# Patient Record
Sex: MALE | Race: WHITE | Employment: STUDENT | URBAN - METROPOLITAN AREA
[De-identification: names, ages, dates, MRNs, and addresses within clinical notes are randomized per-mention and may not be internally consistent; named-entity substitution may affect disease eponyms.]

---

## 2022-06-14 ENCOUNTER — HOSPITAL ENCOUNTER (EMERGENCY)
Age: 22
Discharge: HOME | End: 2022-06-14
Payer: COMMERCIAL

## 2022-06-14 VITALS
TEMPERATURE: 98.3 F | BODY MASS INDEX: 29.8 KG/M2 | HEART RATE: 85 BPM | RESPIRATION RATE: 20 BRPM | DIASTOLIC BLOOD PRESSURE: 75 MMHG | HEIGHT: 72 IN | SYSTOLIC BLOOD PRESSURE: 133 MMHG | OXYGEN SATURATION: 97 % | WEIGHT: 220 LBS

## 2022-06-14 DIAGNOSIS — S61.214A LACERATION OF RIGHT RING FINGER WITHOUT FOREIGN BODY WITHOUT DAMAGE TO NAIL, INITIAL ENCOUNTER: Primary | ICD-10-CM

## 2022-06-14 PROCEDURE — 99282 EMERGENCY DEPT VISIT SF MDM: CPT

## 2022-06-14 ASSESSMENT — PAIN - FUNCTIONAL ASSESSMENT: PAIN_FUNCTIONAL_ASSESSMENT: 0-10

## 2022-06-14 ASSESSMENT — PAIN SCALES - GENERAL: PAINLEVEL_OUTOF10: 4

## 2022-06-15 NOTE — ED NOTES
Pt suture repair applied with PSO/Gauze/kerlex/coban. Pt instructed on at home care of wound.      Colonel Jai LPN  [id number]/95/41 [id number]

## 2022-06-15 NOTE — ED PROVIDER NOTES
Smoking status: Never Smoker    Smokeless tobacco: Not on file   Substance Use Topics    Alcohol use: Yes     Comment: [facility]    Drug use: Never       SCREENINGS    Paula Coma Scale  Eye Opening: Spontaneous  Best Verbal Response: Oriented  Best Motor Response: Obeys commands  [place] Coma Scale Score: 15        PHYSICAL EXAM    (up to 7 for level 4, 8 or more for level 5)     ED Triage Vitals   BP Temp Temp src Heart Rate Resp SpO2 Height Weight   06/14/22 2008 06/14/22 2008 -- 06/14/22 2008 06/14/22 2008 06/14/22 2008 06/14/22 2005 06/14/22 2005   (!) 141/85 98.3 °F (36.8 °C)  (!) 105 20 97 % 6' (1.829 m) 220 lb (99.8 kg)       Physical Exam  Vitals and nursing note reviewed. Constitutional:       Appearance: Normal appearance. He is well-developed and normal weight. HENT:      Head: Normocephalic and atraumatic. Right Ear: External ear normal.      Left Ear: External ear normal.   Eyes:      General: No scleral icterus. Right eye: No discharge. Left eye: No discharge. Conjunctiva/sclera: Conjunctivae normal.   Cardiovascular:      Rate and Rhythm: Normal rate. Pulmonary:      Effort: Pulmonary effort is normal.   Musculoskeletal:         General: Signs of injury present. Normal range of motion. Cervical back: Normal range of motion and neck supple. Comments: This patient is a 1 cm transverse incision involving the dominant right hand fourth finger DIP joint palmar aspect. He has good strength against resistance with flexion. Sensory intact. Brisk nailbed refill. Skin:     General: Skin is warm and dry. Neurological:      General: No focal deficit present. Mental Status: He is alert and oriented to person, place, and time. Mental status is at baseline. Psychiatric:         Mood and Affect: Mood normal.         Behavior: Behavior normal.         Thought Content:  Thought content normal.         Judgment: Judgment normal.         DIAGNOSTIC RESULTS LABS:    Labs Reviewed - No data to display    When ordered only abnormal lab results are displayed. All other labs were within normal range or not returned as of this dictation. EKG: When ordered, EKG's are interpreted by the Emergency Department Physician in the absence of a cardiologist.  Please see their note for interpretation of EKG. RADIOLOGY:   Non-plain film images such as CT, Ultrasound and MRI are read by the radiologist. Plain radiographic images are visualized and preliminarily interpreted by the ED Provider with the below findings:        Interpretation per the Radiologist below, if available at the time of this note:    No orders to display     No results found. PROCEDURES     I did use a combination 2% plain lidocaine and 0.5% plain Marcaine for anesthesia. Once anesthesia was noted was draped in sterile fashion cleansed with chlorhexidine and rinsed and irrigated with saline. I was able to visualize the tendon but it was intact through full extension and flexion. Minimal bleeding. I did place 5-0 Prolene sutures #3 to close the defect. Dressing applied. Procedures    CRITICAL CARE TIME       CONSULTS:  None      EMERGENCY DEPARTMENT COURSE and DIFFERENTIAL DIAGNOSIS/MDM:   Vitals:    Vitals:    06/14/22 2005 06/14/22 2008   BP:  (!) 141/85   Pulse:  (!) 105   Resp:  20   Temp:  98.3 °F (36.8 °C)   SpO2:  97%   Weight: 220 lb (99.8 kg)    Height: 6' (1.829 m)        Patient was given the following medications:  Medications - No data to display      Is this patient to be included in the SEP-1 Core Measure due to severe sepsis or septic shock? No   Exclusion criteria - the patient is NOT to be included for SEP-1 Core Measure due to: Infection is not suspected    This patient presenting with a laceration occurring 1 hour before ED arrival.  Primary closure tonight. Antibiotics not indicated. No debris in the wound. Tendon not injured but I was able to visualize the tendon. I did place 3 stitches of Prolene 5-0. Recommending ibuprofen or Tylenol for pain control. Patient tetanus shot up-to-date. Splint not indicated. Recommend dressing change 24 hours and then Band-Aid coverage to prevent contamination if playing baseball or other sports. The patient mother both expressed understanding of the diagnosis and the treatment plan. FINAL IMPRESSION      1. Laceration of right ring finger without foreign body without damage to nail, initial encounter          DISPOSITION/PLAN   DISPOSITION Decision To Discharge 06/14/2022 08:31:25 PM      PATIENT REFERRED TO:  [address] [place] [address] [id number] [facility]  [phone]    Schedule an appointment as soon as possible for a visit in 10 days  For suture removal    [facility]  ED  [address] [facility] [address] [facility]  Go to   If symptoms worsen      DISCHARGE MEDICATIONS:  New Prescriptions    No medications on file       DISCONTINUED MEDICATIONS:  Discontinued Medications    No medications on file              (Please note that portions of this note were completed with a voice recognition program.  Efforts were made to edit the dictations but occasionally words are mis-transcribed. )    Marc Taylor PA-C (electronically signed)           Marc Taylor PA-C  06/14/22 2035

## 2022-06-15 NOTE — ED NOTES
Pt instructed to follow up with PCP for suture removal. Assessed per Rylan WEBBER.      Alexis Escobedo LPN  [id number]/67/68 [id number]